# Patient Record
Sex: FEMALE | Race: WHITE | HISPANIC OR LATINO | Employment: UNEMPLOYED | URBAN - METROPOLITAN AREA
[De-identification: names, ages, dates, MRNs, and addresses within clinical notes are randomized per-mention and may not be internally consistent; named-entity substitution may affect disease eponyms.]

---

## 2017-10-08 ENCOUNTER — HOSPITAL ENCOUNTER (EMERGENCY)
Facility: HOSPITAL | Age: 27
Discharge: HOME | End: 2017-10-08
Attending: EMERGENCY MEDICINE
Payer: COMMERCIAL

## 2017-10-08 VITALS
HEIGHT: 63 IN | OXYGEN SATURATION: 100 % | BODY MASS INDEX: 21.62 KG/M2 | TEMPERATURE: 98 F | WEIGHT: 122 LBS | RESPIRATION RATE: 14 BRPM | SYSTOLIC BLOOD PRESSURE: 104 MMHG | DIASTOLIC BLOOD PRESSURE: 61 MMHG | HEART RATE: 94 BPM

## 2017-10-08 DIAGNOSIS — R42 DIZZINESS: ICD-10-CM

## 2017-10-08 DIAGNOSIS — R11.0 NAUSEA: ICD-10-CM

## 2017-10-08 DIAGNOSIS — H53.143 PHOTOPHOBIA, BILATERAL: ICD-10-CM

## 2017-10-08 DIAGNOSIS — G43.811 OTHER MIGRAINE WITH STATUS MIGRAINOSUS, INTRACTABLE: Primary | ICD-10-CM

## 2017-10-08 PROCEDURE — 99284 EMERGENCY DEPT VISIT MOD MDM: CPT | Mod: 25

## 2017-10-08 PROCEDURE — 96375 TX/PRO/DX INJ NEW DRUG ADDON: CPT

## 2017-10-08 PROCEDURE — 96361 HYDRATE IV INFUSION ADD-ON: CPT

## 2017-10-08 PROCEDURE — 63600175 PHARM REV CODE 636 W HCPCS: Performed by: PHYSICIAN ASSISTANT

## 2017-10-08 PROCEDURE — 25000003 PHARM REV CODE 250: Performed by: PHYSICIAN ASSISTANT

## 2017-10-08 PROCEDURE — 96374 THER/PROPH/DIAG INJ IV PUSH: CPT

## 2017-10-08 RX ORDER — METOCLOPRAMIDE HYDROCHLORIDE 5 MG/ML
10 INJECTION INTRAMUSCULAR; INTRAVENOUS
Status: COMPLETED | OUTPATIENT
Start: 2017-10-08 | End: 2017-10-08

## 2017-10-08 RX ORDER — SODIUM CHLORIDE 9 MG/ML
1000 INJECTION, SOLUTION INTRAVENOUS
Status: COMPLETED | OUTPATIENT
Start: 2017-10-08 | End: 2017-10-08

## 2017-10-08 RX ORDER — KETOROLAC TROMETHAMINE 30 MG/ML
10 INJECTION, SOLUTION INTRAMUSCULAR; INTRAVENOUS
Status: COMPLETED | OUTPATIENT
Start: 2017-10-08 | End: 2017-10-08

## 2017-10-08 RX ORDER — DIPHENHYDRAMINE HYDROCHLORIDE 50 MG/ML
12.5 INJECTION INTRAMUSCULAR; INTRAVENOUS
Status: COMPLETED | OUTPATIENT
Start: 2017-10-08 | End: 2017-10-08

## 2017-10-08 RX ADMIN — METOCLOPRAMIDE 10 MG: 5 INJECTION, SOLUTION INTRAMUSCULAR; INTRAVENOUS at 09:10

## 2017-10-08 RX ADMIN — DIPHENHYDRAMINE HYDROCHLORIDE 12.5 MG: 50 INJECTION, SOLUTION INTRAMUSCULAR; INTRAVENOUS at 09:10

## 2017-10-08 RX ADMIN — KETOROLAC TROMETHAMINE 10 MG: 30 INJECTION, SOLUTION INTRAMUSCULAR at 09:10

## 2017-10-08 RX ADMIN — SODIUM CHLORIDE 1000 ML: 0.9 INJECTION, SOLUTION INTRAVENOUS at 09:10

## 2017-10-09 NOTE — ED PROVIDER NOTES
Encounter Date: 10/8/2017       History     Chief Complaint   Patient presents with    Headache     c/o headache with nausea, and dizziness     The patient presents to the ER c/o a migraine headache. She states that the pain is left frontal. She states that the associated symptoms are nausea and dizziness. She states that light exacerbates the pain. She states that the pain began gradually, over the span of 3-4 hours, this morning. She states that the degree is moderate to severe. She states that the course is waxing and waning. She states that she has a history of migraines. She states that she usually has similar headaches twice monthly on average. She states that her headaches typically resolve with OTC pain medicine. She denies any pre-arrival treatment today. She denies thunderclap, head injury, or worst of life. She denies any fever, chills, confusion, neck pain or stiffness. She denies any possibility of pregnancy currently. She denies breast feeding.            Review of patient's allergies indicates:  No Known Allergies  Past Medical History:   Diagnosis Date    Migraine headache      Past Surgical History:   Procedure Laterality Date     SECTION       History reviewed. No pertinent family history.  Social History   Substance Use Topics    Smoking status: Never Smoker    Smokeless tobacco: Never Used    Alcohol use No     Review of Systems   Constitutional: Negative for chills and fever.   HENT: Negative for congestion, dental problem, ear pain, facial swelling, rhinorrhea, sinus pain, sinus pressure, sneezing, sore throat and trouble swallowing.    Eyes: Positive for photophobia. Negative for discharge and redness.   Respiratory: Negative for cough, chest tightness and shortness of breath.    Cardiovascular: Negative for chest pain, palpitations and leg swelling.   Gastrointestinal: Positive for nausea. Negative for abdominal pain, diarrhea and vomiting.   Genitourinary: Negative for  decreased urine volume, dysuria, menstrual problem and pelvic pain.   Musculoskeletal: Negative for arthralgias, back pain, gait problem, myalgias, neck pain and neck stiffness.   Skin: Negative for color change and rash.   Allergic/Immunologic: Negative for immunocompromised state.   Neurological: Positive for dizziness and headaches. Negative for seizures, syncope, facial asymmetry, speech difficulty, weakness, light-headedness and numbness.   Psychiatric/Behavioral: Negative for confusion.       Physical Exam     Initial Vitals [10/08/17 2122]   BP Pulse Resp Temp SpO2   114/75 87 18 97.6 °F (36.4 °C) 100 %      MAP       88         Physical Exam    Nursing note and vitals reviewed.  Constitutional: She appears well-developed and well-nourished. She appears distressed.   Sitting in dark room. Appears to be distressed. Alert and ambulatory. She is accompanied by her .    HENT:   Head: Atraumatic.   Mouth/Throat: Oropharynx is clear and moist.   Normal otic exam. Clear throat. There is nasal congestion and drainage, but she has been crying. No sinus tenderness. No adenopathy.    Eyes: Conjunctivae and EOM are normal. Pupils are equal, round, and reactive to light.   Sclera white. No injection or hemorrhage.    Neck: Normal range of motion. Neck supple.   Normal ROM. No nuchal rigidity.    Cardiovascular: Normal rate, regular rhythm and intact distal pulses.   Pulmonary/Chest: Breath sounds normal. No respiratory distress.   Abdominal: Soft. There is no tenderness. There is no guarding.   Musculoskeletal: Normal range of motion. She exhibits no edema or tenderness.   Neurological: She is alert and oriented to person, place, and time. She has normal strength. No cranial nerve deficit or sensory deficit.   Normal speech. No facial droop. Normal gait. 5/5 strength extremities x 4. AAO x 3. No focal deficit.    Skin: Skin is warm and dry. Capillary refill takes less than 2 seconds. No rash noted.   Psychiatric:  She has a normal mood and affect. Her behavior is normal.         ED Course   Procedures  Labs Reviewed - No data to display          Medical Decision Making:   Other:   I have discussed this case with another health care provider.    10:37 PM Much improved, ready to go.       Attending:   Physician Attestation Statement: I have reviewed this case with my non-physician provider.                       ED Course      Clinical Impression:   The primary encounter diagnosis was Other migraine with status migrainosus, intractable. Diagnoses of Photophobia, bilateral, Nausea, and Dizziness were also pertinent to this visit.    Disposition:   Disposition: Discharged  Condition: Stable                        Dawit Owens MD  10/08/17 [id number]